# Patient Record
Sex: FEMALE | Race: WHITE | HISPANIC OR LATINO | URBAN - METROPOLITAN AREA
[De-identification: names, ages, dates, MRNs, and addresses within clinical notes are randomized per-mention and may not be internally consistent; named-entity substitution may affect disease eponyms.]

---

## 2021-12-10 ENCOUNTER — APPOINTMENT (RX ONLY)
Dept: URBAN - METROPOLITAN AREA CLINIC 15 | Facility: CLINIC | Age: 66
Setting detail: DERMATOLOGY
End: 2021-12-10

## 2021-12-10 VITALS — WEIGHT: 154 LBS | HEIGHT: 64 IN

## 2021-12-10 DIAGNOSIS — Z41.9 ENCOUNTER FOR PROCEDURE FOR PURPOSES OTHER THAN REMEDYING HEALTH STATE, UNSPECIFIED: ICD-10-CM

## 2021-12-10 PROCEDURE — ? BOTOX

## 2021-12-10 PROCEDURE — ? ADDITIONAL NOTES

## 2021-12-10 PROCEDURE — ? COSMETIC CONSULTATION: THREAD LIFT

## 2021-12-10 PROCEDURE — ? RADIESSE INJECTION

## 2021-12-10 ASSESSMENT — LOCATION DETAILED DESCRIPTION DERM
LOCATION DETAILED: LEFT INFERIOR CENTRAL MALAR CHEEK
LOCATION DETAILED: RIGHT CENTRAL BUCCAL CHEEK
LOCATION DETAILED: LEFT FOREHEAD
LOCATION DETAILED: RIGHT SUPERIOR MEDIAL BUCCAL CHEEK
LOCATION DETAILED: INFERIOR MID FOREHEAD
LOCATION DETAILED: RIGHT INFERIOR FOREHEAD
LOCATION DETAILED: RIGHT FOREHEAD
LOCATION DETAILED: LEFT MEDIAL FOREHEAD
LOCATION DETAILED: LEFT INFERIOR MEDIAL MALAR CHEEK
LOCATION DETAILED: LEFT INFERIOR FOREHEAD
LOCATION DETAILED: LEFT CENTRAL BUCCAL CHEEK
LOCATION DETAILED: RIGHT INFERIOR MEDIAL MALAR CHEEK
LOCATION DETAILED: RIGHT INFERIOR CENTRAL MALAR CHEEK
LOCATION DETAILED: RIGHT INFERIOR LATERAL FOREHEAD
LOCATION DETAILED: LEFT SUPERIOR CENTRAL BUCCAL CHEEK
LOCATION DETAILED: RIGHT INFERIOR MEDIAL FOREHEAD
LOCATION DETAILED: RIGHT MEDIAL FOREHEAD

## 2021-12-10 ASSESSMENT — LOCATION SIMPLE DESCRIPTION DERM
LOCATION SIMPLE: RIGHT FOREHEAD
LOCATION SIMPLE: LEFT FOREHEAD
LOCATION SIMPLE: INFERIOR FOREHEAD
LOCATION SIMPLE: LEFT CHEEK
LOCATION SIMPLE: RIGHT CHEEK

## 2021-12-10 ASSESSMENT — LOCATION ZONE DERM: LOCATION ZONE: FACE

## 2021-12-10 NOTE — PROCEDURE: ADDITIONAL NOTES
Render Risk Assessment In Note?: no
Detail Level: Simple
Additional Notes: . \\n2 Radiesse:$750 each=$1500\\n\\nPlan:\\n\\nInstalift 8 sutures \\n\\nNeck 4 sutures quoted  $350 each suture \\nFace 4 sutures quoted  $350each suture \\n\\nTotal of $2,800
Additional Notes: . \\nForehead \\n1 area of Botox:$350

## 2021-12-10 NOTE — PROCEDURE: RADIESSE INJECTION
Use Map Statement For Sites (Optional): No
Additional Area 3 Volume In Cc: 0
Procedural Text: The filler was administered to the treatment areas noted above.
Consent: Written consent obtained. Risks include but not limited to bruising, beading, irregular texture, ulceration, infection, allergic reaction, scar formation, incomplete augmentation, temporary nature, procedural pain.
Anesthesia Type: 1% lidocaine with epinephrine
Price (Use Numbers Only, No Special Characters Or $): [address]
Nasolabial Folds Filler Volume In Cc: 1
Filler: Radiesse
Expiration Date (Month Year): 10/09/2023
Map Statement: See [address] for Complete Details
Detail Level: Detailed
Cheeks Filler Volume In Cc: 1.4
Topical Anesthesia?: BLT cream (benzocaine 10%, lidocaine 4%, tetracaine 2%)
Marionette Lines Filler  Volume In Cc: 0.6
Lot #: [id number]
Post-Care Instructions: Patient instructed to apply ice to reduce swelling.
Number Of Syringes (Required For Inventory): 2

## 2021-12-10 NOTE — PROCEDURE: BOTOX
Inferior Lateral Orbicularis Oculi Units: 0
Show Additional Area 5: Yes
Show Right And Left Brow Units: No
Detail Level: Detailed
Lot #: [id number]
Expiration Date (Month Year): 02/2024
Dilution (U/0.1 Cc): 4
Forehead Units: 25
Price (Use Numbers Only, No Special Characters Or $): 0[address]
Additional Area 1 Location: Hands
Consent: Written consent obtained. Risks include but not limited to lid/brow ptosis, bruising, swelling, diplopia, temporary effect, incomplete chemical denervation.
Post-Care Instructions: Patient instructed to not lie down for 4 hours and limit physical activity for 24 hours.
Additional Area 2 Location: left eyebrows tail

## 2022-02-16 ENCOUNTER — APPOINTMENT (RX ONLY)
Dept: URBAN - METROPOLITAN AREA CLINIC 15 | Facility: CLINIC | Age: 67
Setting detail: DERMATOLOGY
End: 2022-02-16

## 2022-02-16 DIAGNOSIS — Z41.9 ENCOUNTER FOR PROCEDURE FOR PURPOSES OTHER THAN REMEDYING HEALTH STATE, UNSPECIFIED: ICD-10-CM

## 2022-02-16 PROCEDURE — ? COSMETIC CONSULTATION: THREAD LIFT

## 2022-02-16 PROCEDURE — ? ADDITIONAL NOTES

## 2022-02-16 NOTE — PROCEDURE: ADDITIONAL NOTES
Render Risk Assessment In Note?: no
Detail Level: Simple
Additional Notes: . \\n\\nPatient is satisfied with results of fillers. \\n2 Radiesse:$750 each=$1500\\n\\nPatient has small nodule on the right marionette line. 5FU and Kenalog was injected to smooth the area.

## 2022-10-07 ENCOUNTER — APPOINTMENT (RX ONLY)
Dept: URBAN - METROPOLITAN AREA CLINIC 15 | Facility: CLINIC | Age: 67
Setting detail: DERMATOLOGY
End: 2022-10-07

## 2022-10-07 VITALS — WEIGHT: 293 LBS | HEIGHT: 55 IN

## 2022-10-07 DIAGNOSIS — Z41.9 ENCOUNTER FOR PROCEDURE FOR PURPOSES OTHER THAN REMEDYING HEALTH STATE, UNSPECIFIED: ICD-10-CM

## 2022-10-07 PROCEDURE — ? FILLERS

## 2022-10-07 PROCEDURE — ? ADDITIONAL NOTES

## 2022-10-07 ASSESSMENT — LOCATION DETAILED DESCRIPTION DERM
LOCATION DETAILED: RIGHT INFERIOR MEDIAL MALAR CHEEK
LOCATION DETAILED: RIGHT CENTRAL BUCCAL CHEEK
LOCATION DETAILED: RIGHT INFERIOR CENTRAL MALAR CHEEK
LOCATION DETAILED: LEFT SUPERIOR CENTRAL BUCCAL CHEEK
LOCATION DETAILED: LEFT CENTRAL BUCCAL CHEEK
LOCATION DETAILED: RIGHT SUPERIOR MEDIAL BUCCAL CHEEK
LOCATION DETAILED: LEFT INFERIOR CENTRAL MALAR CHEEK
LOCATION DETAILED: LEFT INFERIOR MEDIAL MALAR CHEEK

## 2022-10-07 ASSESSMENT — LOCATION SIMPLE DESCRIPTION DERM
LOCATION SIMPLE: LEFT CHEEK
LOCATION SIMPLE: RIGHT CHEEK

## 2022-10-07 ASSESSMENT — LOCATION ZONE DERM: LOCATION ZONE: FACE

## 2022-10-07 NOTE — PROCEDURE: FILLERS
Decollete Filler  Volume In Cc: 0
Post-Care Instructions: Patient instructed to apply ice to reduce swelling.
Number Of Syringes (Required For Inventory): 1
Map Statment: See [address] for Complete Details
Marionette Lines Filler  Volume In Cc: 0.4
Anesthesia Type: 1% lidocaine with epinephrine
Include Cannula Information In Note?: No
Anesthesia Volume In Cc: 0.5
Additional Anesthesia Volume In Cc: 6
Inventory Information: This plan will send filler information to inventory based on the fillers you select. Multiple fillers can be sent but you must ensure you select the appropriate fillers in the inventory tab.
Detail Level: Detailed
Show Inventory Tab: Show
Filler: Radiesse+
Number Of Syringes (Required For Inventory): 2
Cheeks Filler Volume In Cc: 1.4
Consent: Written consent obtained. Risks include but not limited to bruising, beading, irregular texture, ulceration, infection, allergic reaction, scar formation, incomplete augmentation, temporary nature, procedural pain.

## 2022-10-07 NOTE — HPI: COSMETIC (FILLERS)
Have You Had Fillers Before?: has not had fillers
Additional History: Patient is in office for fillers on cheeks and nasolabial folds quoted $750 for each syringe. Two syringes recommended for $1500.

## 2022-10-07 NOTE — PROCEDURE: ADDITIONAL NOTES
Render Risk Assessment In Note?: no
Detail Level: Simple
Additional Notes: . \\n2 Radiesse:$750 each=$1500\\n\\nPlan:\\n\\nInstalift 8 sutures \\n\\nNeck 4 sutures quoted  $350 each suture \\nFace 4 sutures quoted  $350each suture \\n\\nTotal of $2,800

## 2022-10-14 ENCOUNTER — APPOINTMENT (RX ONLY)
Dept: URBAN - METROPOLITAN AREA CLINIC 15 | Facility: CLINIC | Age: 67
Setting detail: DERMATOLOGY
End: 2022-10-14

## 2022-10-14 VITALS — HEIGHT: 64 IN | WEIGHT: 154 LBS

## 2022-10-14 DIAGNOSIS — Z41.9 ENCOUNTER FOR PROCEDURE FOR PURPOSES OTHER THAN REMEDYING HEALTH STATE, UNSPECIFIED: ICD-10-CM

## 2022-10-14 PROCEDURE — ? COSMETIC CONSULTATION: THREAD LIFT

## 2022-10-14 PROCEDURE — ? COSMETIC CONSULTATION: FILLERS

## 2022-10-14 PROCEDURE — ? ADDITIONAL NOTES

## 2022-10-14 ASSESSMENT — LOCATION SIMPLE DESCRIPTION DERM
LOCATION SIMPLE: LEFT CHEEK
LOCATION SIMPLE: RIGHT CHEEK

## 2022-10-14 ASSESSMENT — LOCATION DETAILED DESCRIPTION DERM
LOCATION DETAILED: LEFT MEDIAL BUCCAL CHEEK
LOCATION DETAILED: RIGHT INFERIOR CENTRAL MALAR CHEEK
LOCATION DETAILED: LEFT INFERIOR CENTRAL MALAR CHEEK
LOCATION DETAILED: RIGHT CENTRAL BUCCAL CHEEK
LOCATION DETAILED: LEFT LATERAL BUCCAL CHEEK
LOCATION DETAILED: RIGHT MEDIAL BUCCAL CHEEK

## 2022-10-14 ASSESSMENT — LOCATION ZONE DERM: LOCATION ZONE: FACE

## 2022-10-14 NOTE — PROCEDURE: ADDITIONAL NOTES
Render Risk Assessment In Note?: no
Detail Level: Simple
Additional Notes: Contact information for plastics given today: Dr Kimberly Osorio or Dr Blake Mckeon. \\n\\nPatient is needed more fillers, but she was counseling for a face lift. \\nShe will see some doctors and then, she will decide. \\nShe is aware that threads will support the results.

## 2022-10-14 NOTE — PROCEDURE: MIPS QUALITY
Quality 111:Pneumonia Vaccination Status For Older Adults: Pneumococcal vaccine (PPSV23) was not administered on or after patientâs 60th birthday and before the end of the measurement period, reason not otherwise specified
Detail Level: Detailed
Quality 130: Documentation Of Current Medications In The Medical Record: Current Medications Documented

## 2023-11-09 ENCOUNTER — APPOINTMENT (RX ONLY)
Dept: URBAN - METROPOLITAN AREA CLINIC 15 | Facility: CLINIC | Age: 68
Setting detail: DERMATOLOGY
End: 2023-11-09

## 2023-11-09 VITALS — WEIGHT: 142 LBS | HEIGHT: 64 IN

## 2023-11-09 DIAGNOSIS — Z41.9 ENCOUNTER FOR PROCEDURE FOR PURPOSES OTHER THAN REMEDYING HEALTH STATE, UNSPECIFIED: ICD-10-CM

## 2023-11-09 PROCEDURE — ? ADDITIONAL NOTES

## 2023-11-09 PROCEDURE — ? FILLERS

## 2023-11-09 PROCEDURE — ? COSMETIC CONSULTATION: FILLERS

## 2023-11-09 PROCEDURE — ? COSMETIC CONSULTATION: THREAD LIFT

## 2023-11-09 ASSESSMENT — LOCATION ZONE DERM
LOCATION ZONE: LIP
LOCATION ZONE: FACE

## 2023-11-09 ASSESSMENT — LOCATION DETAILED DESCRIPTION DERM
LOCATION DETAILED: RIGHT CENTRAL MALAR CHEEK
LOCATION DETAILED: RIGHT INFERIOR CENTRAL MALAR CHEEK
LOCATION DETAILED: LEFT MEDIAL BUCCAL CHEEK
LOCATION DETAILED: LEFT LATERAL BUCCAL CHEEK
LOCATION DETAILED: RIGHT MEDIAL BUCCAL CHEEK
LOCATION DETAILED: LEFT INFERIOR CENTRAL MALAR CHEEK
LOCATION DETAILED: RIGHT UPPER CUTANEOUS LIP
LOCATION DETAILED: RIGHT INFERIOR MEDIAL MALAR CHEEK
LOCATION DETAILED: LEFT INFERIOR MEDIAL MALAR CHEEK
LOCATION DETAILED: RIGHT CENTRAL BUCCAL CHEEK
LOCATION DETAILED: LEFT CENTRAL MALAR CHEEK

## 2023-11-09 ASSESSMENT — LOCATION SIMPLE DESCRIPTION DERM
LOCATION SIMPLE: RIGHT CHEEK
LOCATION SIMPLE: LEFT CHEEK
LOCATION SIMPLE: RIGHT LIP

## 2023-11-09 NOTE — PROCEDURE: ADDITIONAL NOTES
Render Risk Assessment In Note?: no
Detail Level: Simple
Additional Notes: Contact information for plastics given today: [place]ER[place].\\nAddress: [address] [facility], [address]: [phone]\\[id number]\\[id number] needed more fillers, but she was counseling for a face lift. \\nShe is aware that threads will support the results.

## 2023-11-09 NOTE — HPI: COSMETIC (FILLERS)
Additional History: Patient is in office for [address] [place] [address]. Patient is interested in fillers for marionettes lines. Patient was recommended 2 syringes for Radiesse.  Each syringe of Radiesse $900 for a total of $1800

## 2023-11-09 NOTE — PROCEDURE: FILLERS
Number Of Syringes (Required For Inventory): 1
Additional Area 3 Volume In Cc: 0
Include Cannula Information In Note?: No
Filler: Radiesse+
Map Statment: See [address] for Complete Details
Anesthesia Type: 1% lidocaine with epinephrine
Marionette Lines Filler  Volume In Cc: 1.5
Consent: Written consent obtained. Risks include but not limited to bruising, beading, irregular texture, ulceration, infection, allergic reaction, scar formation, incomplete augmentation, temporary nature, procedural pain.
Anesthesia Volume In Cc: 0.5
Post-Care Instructions: Patient instructed to apply ice to reduce swelling.
Additional Anesthesia Volume In Cc: 6
Inventory Information: This plan will send filler information to inventory based on the fillers you select. Multiple fillers can be sent but you must ensure you select the appropriate fillers in the inventory tab.
Detail Level: Detailed
Show Inventory Tab: Show